# Patient Record
Sex: MALE | Race: OTHER | NOT HISPANIC OR LATINO
[De-identification: names, ages, dates, MRNs, and addresses within clinical notes are randomized per-mention and may not be internally consistent; named-entity substitution may affect disease eponyms.]

---

## 2022-05-03 ENCOUNTER — APPOINTMENT (OUTPATIENT)
Dept: ORTHOPEDIC SURGERY | Facility: CLINIC | Age: 77
End: 2022-05-03
Payer: MEDICARE

## 2022-05-03 VITALS — WEIGHT: 165 LBS | BODY MASS INDEX: 26.52 KG/M2 | HEIGHT: 66 IN

## 2022-05-03 DIAGNOSIS — I10 ESSENTIAL (PRIMARY) HYPERTENSION: ICD-10-CM

## 2022-05-03 PROBLEM — Z00.00 ENCOUNTER FOR PREVENTIVE HEALTH EXAMINATION: Status: ACTIVE | Noted: 2022-05-03

## 2022-05-03 PROCEDURE — 99203 OFFICE O/P NEW LOW 30 MIN: CPT

## 2022-05-03 RX ORDER — LISINOPRIL 5 MG/1
5 TABLET ORAL
Refills: 0 | Status: ACTIVE | COMMUNITY

## 2022-05-03 NOTE — HISTORY OF PRESENT ILLNESS
[Intermittent] : intermittent [de-identified] : 76 year old male presents for tightness from the shoulder ot the hand. Also having tingling down the arm to the little finger for the past 1 month. Not awoken from sleep. Wakes up with tingling in the hands. Symptoms not constant. No injury/trauma.  [FreeTextEntry3] : about a month  [FreeTextEntry5] : He is feeling a lot of numbness down the arm, into the hand. He is also feeling a lot of itching in the hand.

## 2022-05-03 NOTE — PHYSICAL EXAM
[NL (150)] : flexion 150 degrees [NL (0)] : extension 0 degrees [NL (90)] : supination 90 degrees [Right] : right hand [] : negative Phalen's [de-identified] : thumb abduction 5/5 and FDI 5-/5

## 2022-06-07 ENCOUNTER — APPOINTMENT (OUTPATIENT)
Dept: ORTHOPEDIC SURGERY | Facility: CLINIC | Age: 77
End: 2022-06-07
Payer: MEDICARE

## 2022-06-07 VITALS — HEIGHT: 66 IN | WEIGHT: 165 LBS | BODY MASS INDEX: 26.52 KG/M2

## 2022-06-07 PROCEDURE — 99213 OFFICE O/P EST LOW 20 MIN: CPT

## 2022-06-07 NOTE — HISTORY OF PRESENT ILLNESS
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 Has nightly splinted and avoiding prolong flexion. Had a few nights where he did not wear the splint at night and felt an increase in his symptoms.  [FreeTextEntry1] : right arm  [FreeTextEntry5] : no changes since last visit

## 2022-07-05 ENCOUNTER — APPOINTMENT (OUTPATIENT)
Dept: ORTHOPEDIC SURGERY | Facility: CLINIC | Age: 77
End: 2022-07-05

## 2022-07-05 VITALS — BODY MASS INDEX: 25.71 KG/M2 | HEIGHT: 66 IN | WEIGHT: 160 LBS

## 2022-07-05 DIAGNOSIS — G56.21 LESION OF ULNAR NERVE, RIGHT UPPER LIMB: ICD-10-CM

## 2022-07-05 DIAGNOSIS — G56.01 CARPAL TUNNEL SYNDROME, RIGHT UPPER LIMB: ICD-10-CM

## 2022-07-05 PROCEDURE — 99213 OFFICE O/P EST LOW 20 MIN: CPT

## 2022-07-05 NOTE — HISTORY OF PRESENT ILLNESS
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 Has nightly splinted and avoiding prolong flexion. Reports improvement in his symptoms.  [] : Post Surgical Visit: no [FreeTextEntry1] : R arm  [FreeTextEntry3] : 4/3/22 approx [FreeTextEntry5] : 75 y/o M Eval R arm NKI pain ongoing approx 3 months pt states condition has improved since last visit  [FreeTextEntry7] : Down the R arm  [de-identified] : None